# Patient Record
Sex: FEMALE | Race: WHITE
[De-identification: names, ages, dates, MRNs, and addresses within clinical notes are randomized per-mention and may not be internally consistent; named-entity substitution may affect disease eponyms.]

---

## 2018-02-02 ENCOUNTER — HOSPITAL ENCOUNTER (OUTPATIENT)
Dept: HOSPITAL 68 - STS | Age: 63
End: 2018-02-02
Attending: UROLOGY
Payer: COMMERCIAL

## 2018-02-02 VITALS — HEIGHT: 62 IN | WEIGHT: 204 LBS | BODY MASS INDEX: 37.54 KG/M2

## 2018-02-02 DIAGNOSIS — K21.9: ICD-10-CM

## 2018-02-02 DIAGNOSIS — R35.1: ICD-10-CM

## 2018-02-02 DIAGNOSIS — N39.3: Primary | ICD-10-CM

## 2018-02-02 DIAGNOSIS — J45.909: ICD-10-CM

## 2018-02-02 PROCEDURE — C1771 REP DEV, URINARY, W/SLING: HCPCS

## 2018-02-02 NOTE — OPERATIVE REPORT
Operative/Inv Procedure Report
Surgery Date: 02/02/18
Name of Procedure:
urethral sling, cystoscopy
Pre-Operative Diagnosis:
stress incontinence
Post-Operative Diagnosis:
same
Estimated Blood Loss: less than 50ml
Surgeon/Assistant:
Olivia Richmond MD
 
Anesthesia: laryngeal mask airway
Implants:
vaginal mesh
Complications:
none
Condition:
stable
Operative Indication:
stress incontinence
 
Operative/Procedure Note
Note:
Operative report on Floresita Hammer.  This is a 61yo female with stress 
incontinence.  She was eager to move forward with sling surgery.  She was given 
the risks benefits and alternatives of the surgery and all questions were 
answered in the office.  The consent was signed in the holding area and all of 
the risks benefits and alternatives were reviewed again and all questions were 
answered again.
 
Patient was taken to the operating room placed on the operating table in the 
supine position.  Timeout was performed.  IV antibiotics were infused.  LMA 
general anesthesia was started.  When she was completely anesthetized she was 
placed the dorsal lithotomy position.  She was shaved in the genitalia region. 1
% lidocaine with epinephrine was infiltrated into the anterior vaginal wall 
beneath the urethra.  Incision was made and the vaginal flaps were created 
taking care not to injure the urethra with the dissection carried to the 
obturator space. The Altis Sling kit was then opened and the sling was placed 
with the trochars provided.  Sling was placed in a tension-free manner and 
tightened with a DeBakey between the urethra and the sling.  Once was seen to be
in good position the tightening Prolene suture was cut.  The area was copiously 
irrigated with bacitracin irrigation and the incision was closed with 3-0 Vicryl
running locking suture every third.  There was no mesh in the vaginal fornices. 
Cystoscopy was performed and there was no mesh in the bladder or urethra or 
vaginal fornices.  The bladder was emptied. 2 inch vaginal packing impregnated 
with bacitracin ointment was placed in the vaginal vault.  Sponge and needle 
count were correct at the end of the case.  Patient tolerated procedure well.
Findings:
no mesh in urethra, bladder or vaginal fornices
Discharge Disposition: Same Day Admissions

## 2018-07-29 ENCOUNTER — HOSPITAL ENCOUNTER (EMERGENCY)
Dept: HOSPITAL 68 - ERH | Age: 63
End: 2018-07-29
Payer: COMMERCIAL

## 2018-07-29 VITALS — SYSTOLIC BLOOD PRESSURE: 121 MMHG | DIASTOLIC BLOOD PRESSURE: 72 MMHG

## 2018-07-29 DIAGNOSIS — M54.5: Primary | ICD-10-CM

## 2018-07-29 NOTE — ED NECK/BACK PAIN COMPLAINT
History of Present Illness
 
General
Chief Complaint: Low Back Pain/Injury
Stated Complaint: BACK PAIN
Source: patient, 
Exam Limitations: no limitations
 
Vital Signs & Intake/Output
Vital Signs & Intake/Output
 Vital Signs
 
 
Date Time Temp Pulse Resp B/P B/P Pulse O2 O2 Flow FiO2
 
     Mean Ox Delivery Rate 
 
07/29 0537  67 20 121/72  98   
 
07/29 0415 97.0 71 20 138/67  96 Room Air  
 
 
 
Allergies
Coded Allergies:
venom-honey bee (ANAPHYLAXIS 01/27/18)
Uncoded Allergies:
"PAIN MEDS" (Severe, PER PT IS OKAY WITH ZOFRAN 01/27/18)
 
Reconcile Medications
Alprazolam 0.5 MG TABLET   1 TAB PO PRN ANXIETY  (Reported)
Brexpiprazole (Rexulti) 0.5 MG TABLET   1 TAB PO DAILY MENTAL HEALTH  (Reported)
Calcium Carbonate/Vitamin D3 (Calcium 500 + D Tablet) (Unknown Strength) TABLET 
 (Unknown Dose) PO DAILY SUPPLEMENT  (Reported)
Citalopram Hydrobromide (Citalopram HBr) 40 MG TABLET   1 TAB PO DAILY MENTAL 
HEALTH  (Reported)
Multiple Vitamin (Multivitamins) 1 EACH TABLET   1 TAB PO DAILY SUPPLEMENT  (
Reported)
 
Triage Note:
PER PT HX OF HERNIATED DISC, WAS GETTING DRESSED
 ON WEDNESDAY AND HURT IT, WORSENING PAIN SINCE
 TONIGHT UNABLE TO AMBULATE.
 PER EMS 40 MINUTES TO MOVE FROM BED TO EMS
 STRETCHER.
 UPON ED ARRIVAL ABLE TO MOVE SELF VERY SLOWLY TO
 STRETCHER. REQUESTS XANAX AND PO ZOFRAN UPON
 ARRIVAL
Triage Nurses Notes Reviewed? yes
HPI:
Patient presents for evaluation of low back pain that began one week ago.  
Patient states that she was getting dressed and her back "went out".  Since then
she has had a more or less constant pain in the low back that worsens with 
certain movements and positions.  She saw her chiropractor on Monday but has 
felt worse since.  She also tried "cannabis cream" without relief.  She denies 
any urinary or fecal incontinence or urinary retention.  She likewise denies 
saddle paresthesias.  She does provide a history of chronic back pain secondary 
to herniated disc.  She has not tried any medications for the pain to this 
point.
 
Past History
 
Travel History
Traveled to [place] past 21 day No
 
Medical History
Any Pertinent Medical History? see below for history
Neurological: TOURETTS
EENT: NONE
Cardiovascular: NONE
Respiratory: NONE
Gastrointestinal: NONE
Hepatic: NONE
Renal: NONE
Musculoskeletal: HERNIATED DISC
Psychiatric: PANIC/ANXIETY DISORDER
Endocrine: NONE
Blood Disorders: NONE
Cancer(s): NONE
GYN/Reproductive: NONE
 
Surgical History
Surgical History: NECK/BACK SURGERY
 
Psychosocial History
Who do you live with Family
Services at Home NONE
What is your primary language English
Tobacco Use: Never used
 
Family History
Hx Contributory? No
 
Review of Systems
 
Review of Systems
Constitutional:
Reports: no symptoms. 
Eyes:
Reports: no symptoms. 
Ears, Nose, Throat, Mouth:
Reports: no symptoms. 
Respiratory:
Reports: no symptoms. 
Cardiovascular:
Reports: no symptoms. 
Gastrointestinal/Abdominal:
Reports: no symptoms. 
Musculoskeletal:
Reports: see HPI. 
Skin:
Reports: no symptoms. 
Neurological/Psychological:
Reports: no symptoms. 
All Other Systems: Reviewed and Negative
 
Physical Exam
 
Physical Exam
Neck: SEE BELOW
Comments:
Gen.: Well-nourished, well-developed, no acute respiratory distress.  Mild to 
moderate distress while at rest secondary to back pain.  Pain worsens with 
movement.
Head: Normocephalic, atraumatic.
Eyes: Normal inspection bilaterally
Ears: Normal inspection bilaterally
Nose: Normal inspection
Throat/mouth : Moist mucosa 
Neck: Supple, full range of motion, no goiter
Lungs: Quiet respirations
Back: Decreased range of motion secondary to pain.  Tenderness over the 
lumbosacral spine without associated soft tissue swelling ecchymoses or 
erythema.
Extremities: Normal range of motion grossly, lower extremities: No straight leg 
raise sign bilaterally, sensation intact bilaterally (no saddle paresthesias), 
deep tendon reflexes normal bilaterally, sensation to light touch intact 
bilaterally.
Neurologic: Cranial nerves grossly intact, speech is clear
Skin: warm and dry
Psychiatric: Calm, cooperative, no apparent delusions or hallucinations
 
Core Measures
CVA/TIA Diagnosis: No
 
Progress
Differential Diagnosis: cauda equina syn, herniated disc, myofascial strain, 
sciatica, MUSCLE STRAIN
Plan of Care:
 Current Medications
 
 
  Sig/Urvashi Start time  Last
 
Medication Dose  Stop Time Status Admin
 
Cyclobenzaprine HCl 10 MG ONCE ONE 07/29 0445 UNVr 
 
(Flexeril 10MG Tab)   07/29 0446  
 
Ketorolac  30 MG ONE ONE 07/29 0445 UNVr 
 
Tromethamine   07/29 0446  
 
(Toradol)     
 
Morphine Sulfate 4 MG ONCE ONE 07/29 0445 UNVr 
 
(MORPHINE SULFATE)   07/29 0446  
 
Ondansetron HCl 4 MG ONCE ONE 07/29 0445 UNVr 07/29
 
(Zofran)   07/29 0446  0442
 
 
 
Comments:
7/29/2018 5:34:52 AM Floresita is beginning to ambulate in the emergency department
and feels better.  She has declined any additional medication here in the 
emergency department.  She states that the pain is making her bit nauseous so 
she agrees to Zofran as well.
 
Departure
 
Departure
Disposition: HOME OR SELF CARE
Condition: Stable
Clinical Impression
Primary Impression: Low back pain
Qualifiers:  Chronicity: acute Back pain laterality: midline Sciatica presence: 
without sciatica Qualified Code: M54.5 - Low back pain
Referrals:
Coelho Dario BURCH (PCP/Family)
 
Additional Instructions:
Rest, no exertion or heavy lifting.  Ketorolac as prescribed for pain.  Flexeril
as needed for muscle spasms.  Follow-up with your primary care physician this 
week for reevaluation.  Return if any concerns or sudden worsening.
 
Thank you for choosing the [facility] Emergency Department for your care.
It was a pleasure to serve you today.
 
Jhon Hood M.D.
[facility]
Departure Forms:
Customer Survey
General Discharge Information
Prescriptions:
Current Visit Scripts
Ketorolac Tromethamine 1 TAB PO Q6P PRN BACK PAIN 
     #16 TAB 
 
Cyclobenzaprine HCl 1 TAB PO Q8P  
     #21 TAB 
 
Ondansetron HCl (Zofran) 1 TAB PO Q6 PRN NAUSEA/VOMITING 
     #12 TAB 
 
 
 
ED Attending Observation
Initial Observation Note:
I have seen and personally examined FLORESITA BALES 
on 07/29/18 at 0445. 
 
I agree with the current emergency department documentation.  The disposition 
(admission or discharge) is uncertain at this time, she needs a period of 
observation for the following reason(s): 
 
The ED Nurse caring for this patient has been personally informed as to what the
patient is being observed for.